# Patient Record
Sex: FEMALE | Race: ASIAN | URBAN - METROPOLITAN AREA
[De-identification: names, ages, dates, MRNs, and addresses within clinical notes are randomized per-mention and may not be internally consistent; named-entity substitution may affect disease eponyms.]

---

## 2017-04-05 ENCOUNTER — OFFICE VISIT (OUTPATIENT)
Dept: FAMILY MEDICINE | Facility: CLINIC | Age: 22
End: 2017-04-05
Payer: COMMERCIAL

## 2017-04-05 VITALS
WEIGHT: 116 LBS | BODY MASS INDEX: 18.64 KG/M2 | OXYGEN SATURATION: 99 % | HEART RATE: 56 BPM | DIASTOLIC BLOOD PRESSURE: 60 MMHG | TEMPERATURE: 97.5 F | HEIGHT: 66 IN | SYSTOLIC BLOOD PRESSURE: 110 MMHG

## 2017-04-05 DIAGNOSIS — I88.9 CERVICAL LYMPHADENITIS: Primary | ICD-10-CM

## 2017-04-05 DIAGNOSIS — K13.79 MOUTH SORE: ICD-10-CM

## 2017-04-05 DIAGNOSIS — J02.0 STREPTOCOCCAL PHARYNGITIS: ICD-10-CM

## 2017-04-05 PROCEDURE — 99202 OFFICE O/P NEW SF 15 MIN: CPT | Performed by: FAMILY MEDICINE

## 2017-04-05 RX ORDER — CEFDINIR 300 MG/1
300 CAPSULE ORAL 2 TIMES DAILY
Qty: 20 CAPSULE | Refills: 0 | Status: SHIPPED | OUTPATIENT
Start: 2017-04-05 | End: 2018-04-25

## 2017-04-05 RX ORDER — CHLORHEXIDINE GLUCONATE ORAL RINSE 1.2 MG/ML
15 SOLUTION DENTAL 2 TIMES DAILY
Qty: 120 ML | Refills: 1 | Status: SHIPPED | OUTPATIENT
Start: 2017-04-05 | End: 2018-04-25

## 2017-04-05 RX ORDER — PENICILLIN V POTASSIUM 500 MG/1
TABLET, FILM COATED ORAL
Refills: 0 | COMMUNITY
Start: 2017-03-29 | End: 2018-04-25

## 2017-04-05 RX ORDER — PREDNISONE 10 MG/1
10 TABLET ORAL 2 TIMES DAILY
Qty: 10 TABLET | Refills: 0 | Status: SHIPPED | OUTPATIENT
Start: 2017-04-05 | End: 2018-04-25

## 2017-04-05 NOTE — NURSING NOTE
"Chief Complaint   Patient presents with     Tongue Lesion(S)       Initial /60  Pulse 56  Temp 97.5  F (36.4  C) (Tympanic)  Ht 5' 6\" (1.676 m)  Wt 116 lb (52.6 kg)  SpO2 99%  BMI 18.72 kg/m2 Estimated body mass index is 18.72 kg/(m^2) as calculated from the following:    Height as of this encounter: 5' 6\" (1.676 m).    Weight as of this encounter: 116 lb (52.6 kg).  Medication Reconciliation: complete     Pia Chen CMA      "

## 2017-04-05 NOTE — MR AVS SNAPSHOT
"              After Visit Summary   2017    Griselda Rees    MRN: [id number]           Patient Information     Date Of Birth          1995        Visit Information        Provider Department      2017 11:20 AM Buddy Valladares MD [facility] [place]        Today's Diagnoses     Cervical lymphadenitis    -  1    Streptococcal pharyngitis        Mouth sore           Follow-ups after your visit        Who to contact     If you have questions or need follow up information about today's clinic visit or your schedule please contact [facility] directly at [phone].  Normal or non-critical lab and imaging results will be communicated to you by [employer], letter or phone within 4 business days after the clinic has received the results. If you do not hear from us within 7 days, please contact the clinic through [employer] or phone. If you have a critical or abnormal lab result, we will notify you by phone as soon as possible.  Submit refill requests through [employer] or call your pharmacy and they will forward the refill request to us. Please allow 3 business days for your refill to be completed.          Additional Information About Your Visit        MyChart Information     [employer] lets you send messages to your doctor, view your test results, renew your prescriptions, schedule appointments and more. To sign up, go to [email or web address] . Click on \"Log in\" on the left side of the screen, which will take you to the Welcome page. Then click on \"Sign up Now\" on the right side of the page.     You will be asked to enter the access code listed below, as well as some personal information. Please follow the directions to create your username and password.     Your access code is: [email or web address]  Expires: 2017 11:55 AM     Your access code will  in 90 days. If you need help or a new code, please call your [facility] or [phone].        Care EveryWhere ID     This is your " "Care EveryWhere ID. This could be used by other organizations to access your [place] medical records  [id number]        Your Vitals Were     Pulse Temperature Height Pulse Oximetry BMI (Body Mass Index)       56 97.5  F (36.4  C) (Tympanic) 5' 6\" (1.676 m) 99% 18.72 kg/m2        Blood Pressure from Last 3 Encounters:   04/05/17 110/60    Weight from Last 3 Encounters:   04/05/17 116 lb (52.6 kg)              Today, you had the following     No orders found for display         Today's Medication Changes          These changes are accurate as of: 4/5/17 11:55 AM.  If you have any questions, ask your nurse or doctor.               Start taking these medicines.        Dose/Directions    cefdinir 300 MG capsule   Commonly known as:  OMNICEF   Used for:  Cervical lymphadenitis, Streptococcal pharyngitis, Mouth sore   Started by:  Buddy Valladares MD        Dose:  300 mg   Take 1 capsule (300 mg) by mouth 2 times daily   Quantity:  20 capsule   Refills:  0       chlorhexidine 0.12 % solution   Commonly known as:  PERIDEX   Used for:  Cervical lymphadenitis, Streptococcal pharyngitis, Mouth sore   Started by:  Buddy Valladares MD        Dose:  15 mL   Swish and spit 15 mLs in mouth 2 times daily   Quantity:  120 mL   Refills:  1       predniSONE 10 MG tablet   Commonly known as:  DELTASONE   Used for:  Cervical lymphadenitis, Streptococcal pharyngitis, Mouth sore   Started by:  Buddy Valladares MD        Dose:  10 mg   Take 1 tablet (10 mg) by mouth 2 times daily   Quantity:  10 tablet   Refills:  0            Where to get your medicines      These medications were sent to [facility] [address] [place] [facility] [address] [address]  [address] [address]     Phone:  [phone]     cefdinir 300 MG capsule    chlorhexidine 0.12 % solution    predniSONE 10 MG tablet                Primary Care Provider    None Specified       No primary provider on file.        Thank you!     Thank you for choosing [facility]  for your care. Our goal is always to provide you with excellent care. Hearing back from our patients is one way we can continue to improve our services. Please take a few minutes to complete the written survey that you may receive in the mail after your visit with us. Thank you!             Your Updated Medication List - Protect others around you: Learn how to safely use, store and throw away your medicines at www.disposemymeds.org.          This list is accurate as of: 4/5/17 11:55 AM.  Always use your most recent med list.                   Brand Name Dispense Instructions for use    cefdinir 300 MG capsule    OMNICEF    20 capsule    Take 1 capsule (300 mg) by mouth 2 times daily       chlorhexidine 0.12 % solution    PERIDEX    120 mL    Swish and spit 15 mLs in mouth 2 times daily       penicillin V potassium 500 MG tablet    VEETID     TAKE 1 TABLET (500 MG) BY MOUTH FOUR TIMES DAILY FOR 10 DAYS.       predniSONE 10 MG tablet    DELTASONE    10 tablet    Take 1 tablet (10 mg) by mouth 2 times daily

## 2017-04-05 NOTE — PROGRESS NOTES
SUBJECTIVE:                                                    Griselda Rees is a 21 year old female who presents to clinic today for the following health issues:      Acute Illness   Acute illness concerns: soreness on tongue  Onset: x one day    Fever: no    Chills/Sweats: no    Headache (location?): no    Sinus Pressure:no    Conjunctivitis:  no    Ear Pain: no    Rhinorrhea: no    Congestion: no    Sore Throat: YES- is currently being treated for strep     Cough: no    Wheeze: no    Decreased Appetite: no    Nausea: no    Vomiting: no    Diarrhea:  no    Dysuria/Freq.: no    Fatigue/Achiness: no    Sick/Strep Exposure: no     Therapies Tried and outcome: antibiotics tylenol        Problem list and histories reviewed & adjusted, as indicated.  Additional history: as documented    There is no problem list on file for this patient.    History reviewed. No pertinent surgical history.    Social History   Substance Use Topics     Smoking status: Never Smoker     Smokeless tobacco: Not on file     Alcohol use Yes     History reviewed. No pertinent family history.      Current Outpatient Prescriptions   Medication Sig Dispense Refill     penicillin V potassium (VEETID) 500 MG tablet TAKE 1 TABLET (500 MG) BY MOUTH FOUR TIMES DAILY FOR 10 DAYS.  0     cefdinir (OMNICEF) 300 MG capsule Take 1 capsule (300 mg) by mouth 2 times daily 20 capsule 0     predniSONE (DELTASONE) 10 MG tablet Take 1 tablet (10 mg) by mouth 2 times daily 10 tablet 0     chlorhexidine (PERIDEX) 0.12 % solution Swish and spit 15 mLs in mouth 2 times daily 120 mL 1     No Known Allergies  No lab results found.   BP Readings from Last 3 Encounters:   04/05/17 110/60    Wt Readings from Last 3 Encounters:   04/05/17 116 lb (52.6 kg)                    Reviewed and updated as needed this visit by clinical staff       Reviewed and updated as needed this visit by Provider         ROS:  Constitutional, HEENT, cardiovascular, pulmonary, gi and gu  "systems are negative, except as otherwise noted.    OBJECTIVE:                                                    /60  Pulse 56  Temp 97.5  F (36.4  C) (Tympanic)  Ht 5' 6\" (1.676 m)  Wt 116 lb (52.6 kg)  SpO2 99%  BMI 18.72 kg/m2  Body mass index is 18.72 kg/(m^2).  GENERAL: healthy, alert and no distress  HENT: normal cephalic/atraumatic, both ears: normal: no effusions, no erythema, normal landmarks, oropharynx clear and prominent papilla on tongue   NECK: cervical adenopathy right anterior neck and thyroid normal to palpation  RESP: lungs clear to auscultation - no rales, rhonchi or wheezes  CV: regular rate and rhythm, normal S1 S2, no S3 or S4, no murmur, click or rub, no peripheral edema and peripheral pulses strong  ABDOMEN: soft, nontender, no hepatosplenomegaly, no masses and bowel sounds normal  MS: no gross musculoskeletal defects noted, no edema         ASSESSMENT/PLAN:                                                    Griselda was seen today for tongue lesion(s).    Diagnoses and all orders for this visit:    Cervical lymphadenitis  -     cefdinir (OMNICEF) 300 MG capsule; Take 1 capsule (300 mg) by mouth 2 times daily  -     predniSONE (DELTASONE) 10 MG tablet; Take 1 tablet (10 mg) by mouth 2 times daily  -     chlorhexidine (PERIDEX) 0.12 % solution; Swish and spit 15 mLs in mouth 2 times daily    Streptococcal pharyngitis  -     cefdinir (OMNICEF) 300 MG capsule; Take 1 capsule (300 mg) by mouth 2 times daily  -     predniSONE (DELTASONE) 10 MG tablet; Take 1 tablet (10 mg) by mouth 2 times daily  -     chlorhexidine (PERIDEX) 0.12 % solution; Swish and spit 15 mLs in mouth 2 times daily    Mouth sore  -     cefdinir (OMNICEF) 300 MG capsule; Take 1 capsule (300 mg) by mouth 2 times daily  -     predniSONE (DELTASONE) 10 MG tablet; Take 1 tablet (10 mg) by mouth 2 times daily  -     chlorhexidine (PERIDEX) 0.12 % solution; Swish and spit 15 mLs in mouth 2 times daily      Pt was on pen " VK for strep pharyngitis, she also was diagnosed influenza infection , started having sore tongue and swollen right cervical L/N since yesterday, has no F/C  Still has lingering cough  Will have her to switch abx to omnicef and will also add prednisone and Peridex for sx control        Buddy Valladares MD  [facility]

## 2018-04-25 ENCOUNTER — OFFICE VISIT (OUTPATIENT)
Dept: FAMILY MEDICINE | Facility: CLINIC | Age: 23
End: 2018-04-25
Payer: COMMERCIAL

## 2018-04-25 VITALS — TEMPERATURE: 97.9 F | DIASTOLIC BLOOD PRESSURE: 56 MMHG | SYSTOLIC BLOOD PRESSURE: 85 MMHG

## 2018-04-25 DIAGNOSIS — Z23 NEED FOR VACCINATION: ICD-10-CM

## 2018-04-25 DIAGNOSIS — Z71.84 TRAVEL ADVICE ENCOUNTER: Primary | ICD-10-CM

## 2018-04-25 PROCEDURE — 90472 IMMUNIZATION ADMIN EACH ADD: CPT | Mod: GA | Performed by: NURSE PRACTITIONER

## 2018-04-25 PROCEDURE — 99402 PREV MED CNSL INDIV APPRX 30: CPT | Mod: 25 | Performed by: NURSE PRACTITIONER

## 2018-04-25 PROCEDURE — 90471 IMMUNIZATION ADMIN: CPT | Mod: GA | Performed by: NURSE PRACTITIONER

## 2018-04-25 PROCEDURE — 90632 HEPA VACCINE ADULT IM: CPT | Mod: GA | Performed by: NURSE PRACTITIONER

## 2018-04-25 PROCEDURE — 90686 IIV4 VACC NO PRSV 0.5 ML IM: CPT | Mod: GA | Performed by: NURSE PRACTITIONER

## 2018-04-25 RX ORDER — ATOVAQUONE AND PROGUANIL HYDROCHLORIDE 250; 100 MG/1; MG/1
1 TABLET, FILM COATED ORAL DAILY
Qty: 20 TABLET | Refills: 0 | Status: SHIPPED | OUTPATIENT
Start: 2018-04-25

## 2018-04-25 RX ORDER — AZITHROMYCIN 500 MG/1
500 TABLET, FILM COATED ORAL DAILY
Qty: 3 TABLET | Refills: 0 | Status: SHIPPED | OUTPATIENT
Start: 2018-04-25 | End: 2018-04-28

## 2018-04-25 NOTE — NURSING NOTE
"Chief Complaint   Patient presents with     [facility] Clinic     initial Temp 97.9  F (36.6  C) (Oral)  LMP 04/01/2018 Estimated body mass index is 18.72 kg/(m^2) as calculated from the following:    Height as of 4/5/17: 5' 6\" (1.676 m).    Weight as of 4/5/17: 116 lb (52.6 kg).  BP completed using cuff size: regular.  L  arm      Health Maintenance that is potentially due pending provider review:  NONE    n/a    Chand[place] ma  "

## 2018-04-25 NOTE — PROGRESS NOTES
Nurse Note      Itinerary:  [facility]      Departure Date: 5/4/18      Return Date: 5/16/18      Length of Trip 12 days      Reason for Travel: Tourism           Urban or rural: both      Accommodations: Hotel        IMMUNIZATION HISTORY  Have you received any immunizations within the past 4 weeks?  No  Have you ever fainted from having your blood drawn or from an injection?  No  Have you ever had a fever reaction to vaccination?  No  Have you ever had any bad reaction or side effect from any vaccination?  No  Have you ever had hepatitis A or B vaccine?  yes  Do you live (or work closely) with anyone who has AIDS, an AIDS-like condition, any other immune disorder or who is on chemotherapy for cancer?  No  Do you have a family history of immunodeficiency?  No  Have you received any injection of immune globulin or any blood products during the past 12 months?  No    Patient roomed by Chandan Interiano  Griselda Rees is a 22 year old female seen today alone for counsultation for international travel to [facility] for Tourism.  Patient will be departing in  10 day(s) and staying for   10 day(s) and  traveling with friends.      Patient itinerary :  will begin in [facility] [facility] [place] [facility] [place] [place]   region of [facility] which presents risk for Malaria, Dengue Fever, Chikungungya, Zika, Schistosomiasis, Japanese Encephalitis, Rabies, food borne illnesses, motor vehicle accidents and Typhoid. exposure.      Patient's activities will include sightseeing, jungle, hiking and beach activities (salt water).    Patient's country of birth is [place]    Special medical concerns: none  Pre-travel questionnaire was completed by patient and reviewed by provider.     Vitals: Temp 97.9  F (36.6  C) (Oral)  LMP 04/01/2018  BMI= There is no height or weight on file to calculate BMI.    EXAM:  General:  Well-nourished, well-developed in no acute distress.  Appears to be stated age, interacts  appropriately and expresses understanding of information given to patient.    Current Outpatient Prescriptions   Medication Sig Dispense Refill     cefdinir (OMNICEF) 300 MG capsule Take 1 capsule (300 mg) by mouth 2 times daily 20 capsule 0     chlorhexidine (PERIDEX) 0.12 % solution Swish and spit 15 mLs in mouth 2 times daily 120 mL 1     penicillin V potassium (VEETID) 500 MG tablet TAKE 1 TABLET (500 MG) BY MOUTH FOUR TIMES DAILY FOR 10 DAYS.  0     predniSONE (DELTASONE) 10 MG tablet Take 1 tablet (10 mg) by mouth 2 times daily 10 tablet 0     There is no problem list on file for this patient.    No Known Allergies      Immunizations discussed include:   Hepatitis A:  Ordered/given today, risks, benefits and side effects reviewed  Hepatitis B: declined  Influenza: Ordered/given today, risks, benefits and side effects reviewed  Typhoid: Ordered/given today, risks, benefits and side effects reviewed  Rabies: Insufficient time to vaccinate  Yellow Fever: Not indicated  Japanese Encephalitis: Not indicated - risk of disease is minimal no rurla  Meningococcus: Up to date  Tetanus/Diphtheria: Up to date  Measles/Mumps/Rubella: no records but presumed, declines titer  Cholera: Not needed  Polio: Up to date  Pneumococcal: Under age of 65  Varicella: Up to date  Zostavax:  Not indicated  Shingrix: Not indicated  HPV:  deferred  TB:  Low risk     Altitude Exposure on this trip: no  Past tolerance to Altitude: na    ASSESSMENT/PLAN:    ICD-10-CM    1. Travel advice encounter Z71.89 atovaquone-proguanil (MALARONE) 250-100 MG per tablet     HEPA VACCINE ADULT IM     HC FLU VAC PRESRV FREE QUAD SPLIT VIR 3+YRS IM     azithromycin (ZITHROMAX) 500 MG tablet     CANCELED: TYPHOID VACCINE, IM   2. Need for vaccination Z23 atovaquone-proguanil (MALARONE) 250-100 MG per tablet     HEPA VACCINE ADULT IM     HC FLU VAC PRESRV FREE QUAD SPLIT VIR 3+YRS IM     CANCELED: TYPHOID VACCINE, IM     I have reviewed general recommendations  for safe travel   including: food/water precautions, insect precautions, safer sex   practices given high prevalence of Zika, HIV and other STDs,   roadway safety. Educational materials and [employer] report provided.    Malaraia prophylaxis recommended: Malarone  Symptomatic treatment for traveler's diarrhea: azithromycin  Altitude illness prevention and treatment: none      Evacuation insurance advised and resources were provided to patient.    Total visit time 30 minutes  with over 50% of time spent counseling patient as detailed above.    Alis Stephenson CNP

## 2018-04-25 NOTE — MR AVS SNAPSHOT
After Visit Summary   4/25/2018    Griselda Rees    MRN: [id number]           Patient Information     Date Of Birth          1995        Visit Information        Provider Department      4/25/2018 11:15 AM Alis Stephenson APRN CNP [facility]        Today's Diagnoses     Travel advice encounter    -  1    Need for vaccination          Care Instructions    Today April 25, 2018 you received the    Flu Vaccine    Hepatitis A Vaccine - Please return on 10/22/18 or later for your 2nd and final dose.    Oral Typhoid  ( good for 5 years)   .    These appointments can be made as a NURSE ONLY visit.    **It is very important for the vaccinations to be given on the scheduled day(s), this helps ensure you receive the full effectiveness of the vaccine.**    Please call [facility] with any questions [phone]    Thank you for visiting [facility]              Follow-ups after your visit        Who to contact     If you have questions or need follow up information about today's clinic visit or your schedule please contact [facility] directly at [phone].  Normal or non-critical lab and imaging results will be communicated to you by [employer], letter or phone within 4 business days after the clinic has received the results. If you do not hear from us within 7 days, please contact the clinic through [employer] or phone. If you have a critical or abnormal lab result, we will notify you by phone as soon as possible.  Submit refill requests through [employer] or call your pharmacy and they will forward the refill request to us. Please allow 3 business days for your refill to be completed.          Additional Information About Your Visit        MyChart Information     [employer] lets you send messages to your doctor, view your test results, renew your prescriptions, schedule appointments and more. To sign up, go to [email or web address] . Click  "on \"Log in\" on the left side of the screen, which will take you to the Welcome page. Then click on \"Sign up Now\" on the right side of the page.     You will be asked to enter the access code listed below, as well as some personal information. Please follow the directions to create your username and password.     Your access code is: [email or web address]  Expires: 2018 12:25 PM     Your access code will  in 90 days. If you need help or a new code, please call your [place] clinic or [phone].        Care EveryWhere ID     This is your Care EveryWhere ID. This could be used by other organizations to access your [place] medical records  [id number]        Your Vitals Were     Temperature Last Period                97.9  F (36.6  C) (Oral) 2018           Blood Pressure from Last 3 Encounters:   18 (!) 85/56   17 110/60    Weight from Last 3 Encounters:   17 116 lb (52.6 kg)              We Performed the Following     HC FLU VAC PRESRV FREE QUAD SPLIT VIR 3+YRS IM     HEPA VACCINE ADULT IM          Today's Medication Changes          These changes are accurate as of 18 12:25 PM.  If you have any questions, ask your nurse or doctor.               Start taking these medicines.        Dose/Directions    atovaquone-proguanil 250-100 MG per tablet   Commonly known as:  MALARONE   Used for:  Need for vaccination, Travel advice encounter   Started by:  Alis Stephenson APRN CNP        Dose:  1 tablet   Take 1 tablet by mouth daily Start 2 days before exposure to Malaria and continue daily till  7 days after exposure.   Quantity:  20 tablet   Refills:  0       azithromycin 500 MG tablet   Commonly known as:  ZITHROMAX   Used for:  Travel advice encounter   Started by:  Alis Stephenson APRN CNP        Dose:  500 mg   Take 1 tablet (500 mg) by mouth daily for 3 doses Take 1 tablet a day for up to 3 days for severe diarrhea   Quantity:  3 tablet   Refills:  0       typhoid CR capsule "   Commonly known as:  VIVOTIF   Used for:  Need for vaccination, Travel advice encounter   Started by:  Alis Stephenson APRN CNP        Dose:  1 capsule   Take 1 capsule by mouth every other day   Quantity:  4 capsule   Refills:  0            Where to get your medicines      These medications were sent to [facility] [address]  [address] [facility] [address]     Phone:  [phone]     atovaquone-proguanil 250-100 MG per tablet    azithromycin 500 MG tablet    typhoid CR capsule                Primary Care Provider Office Phone # Fax #    [place] [facility] [phone] [address] [facility] [address] [address]        Equal Access to Services     YOANDY JERONIMO : Hadii óscar quintanilal hadasho Soomaali, waaxda luqadaha, qaybta kaalmada adeegyada, waxandrés claire . So [facility] [phone].    ATENCIÓN: Si habla español, tiene a mcdowell disposición servicios gratuitos de asistencia lingüística. [facility] [phone].    We comply with applicable federal civil rights laws and [place] laws. We do not discriminate on the basis of race, color, national origin, age, disability, sex, sexual orientation, or gender identity.            Thank you!     Thank you for choosing [facility]  for your care. Our goal is always to provide you with excellent care. Hearing back from our patients is one way we can continue to improve our services. Please take a few minutes to complete the written survey that you may receive in the mail after your visit with us. Thank you!             Your Updated Medication List - Protect others around you: Learn how to safely use, store and throw away your medicines at www.disposemymeds.org.          This list is accurate as of 4/25/18 12:25 PM.  Always use your most recent med list.                   Brand Name Dispense Instructions for use Diagnosis    atovaquone-proguanil 250-100  MG per tablet    MALARONE    20 tablet    Take 1 tablet by mouth daily Start 2 days before exposure to Malaria and continue daily till  7 days after exposure.    Need for vaccination, Travel advice encounter       azithromycin 500 MG tablet    ZITHROMAX    3 tablet    Take 1 tablet (500 mg) by mouth daily for 3 doses Take 1 tablet a day for up to 3 days for severe diarrhea    Travel advice encounter       typhoid CR capsule    VIVOTIF    4 capsule    Take 1 capsule by mouth every other day    Need for vaccination, Travel advice encounter

## 2018-04-25 NOTE — PATIENT INSTRUCTIONS
Today April 25, 2018 you received the    Flu Vaccine    Hepatitis A Vaccine - Please return on 10/22/18 or later for your 2nd and final dose.    Oral Typhoid  ( good for 5 years)   .    These appointments can be made as a NURSE ONLY visit.    **It is very important for the vaccinations to be given on the scheduled day(s), this helps ensure you receive the full effectiveness of the vaccine.**    Please call [facility] with any questions [phone]    Thank you for visiting [facility]